# Patient Record
Sex: MALE | Race: BLACK OR AFRICAN AMERICAN | NOT HISPANIC OR LATINO
[De-identification: names, ages, dates, MRNs, and addresses within clinical notes are randomized per-mention and may not be internally consistent; named-entity substitution may affect disease eponyms.]

---

## 2020-09-29 PROBLEM — Z00.00 ENCOUNTER FOR PREVENTIVE HEALTH EXAMINATION: Status: ACTIVE | Noted: 2020-09-29

## 2020-09-30 ENCOUNTER — APPOINTMENT (OUTPATIENT)
Dept: OTOLARYNGOLOGY | Facility: CLINIC | Age: 55
End: 2020-09-30
Payer: COMMERCIAL

## 2020-09-30 VITALS
OXYGEN SATURATION: 98 % | HEIGHT: 67 IN | BODY MASS INDEX: 37.04 KG/M2 | SYSTOLIC BLOOD PRESSURE: 162 MMHG | HEART RATE: 76 BPM | WEIGHT: 236 LBS | DIASTOLIC BLOOD PRESSURE: 101 MMHG | TEMPERATURE: 97.6 F

## 2020-09-30 DIAGNOSIS — Z78.9 OTHER SPECIFIED HEALTH STATUS: ICD-10-CM

## 2020-09-30 DIAGNOSIS — Z86.79 PERSONAL HISTORY OF OTHER DISEASES OF THE CIRCULATORY SYSTEM: ICD-10-CM

## 2020-09-30 DIAGNOSIS — Z83.3 FAMILY HISTORY OF DIABETES MELLITUS: ICD-10-CM

## 2020-09-30 DIAGNOSIS — M54.12 RADICULOPATHY, CERVICAL REGION: ICD-10-CM

## 2020-09-30 DIAGNOSIS — M50.90 CERVICAL DISC DISORDER, UNSPECIFIED, UNSPECIFIED CERVICAL REGION: ICD-10-CM

## 2020-09-30 PROCEDURE — 99203 OFFICE O/P NEW LOW 30 MIN: CPT | Mod: 25

## 2020-09-30 PROCEDURE — 31575 DIAGNOSTIC LARYNGOSCOPY: CPT

## 2020-10-01 PROBLEM — Z78.9 NEVER USED TOBACCO: Status: ACTIVE | Noted: 2020-09-30

## 2020-10-01 PROBLEM — Z78.9 CAFFEINE USE: Status: ACTIVE | Noted: 2020-10-01

## 2020-10-01 PROBLEM — M54.12 CERVICAL RADICULOPATHY: Status: ACTIVE | Noted: 2020-10-01

## 2020-10-01 PROBLEM — Z86.79 HISTORY OF HYPERTENSION: Status: RESOLVED | Noted: 2020-09-30 | Resolved: 2020-10-01

## 2020-10-01 PROBLEM — Z83.3 FAMILY HISTORY OF DIABETES MELLITUS: Status: ACTIVE | Noted: 2020-09-30

## 2020-10-01 PROBLEM — M50.90 CERVICAL DISC DISEASE: Status: ACTIVE | Noted: 2020-10-01

## 2020-10-01 RX ORDER — LOSARTAN POTASSIUM 100 MG/1
100 TABLET, FILM COATED ORAL
Refills: 0 | Status: ACTIVE | COMMUNITY

## 2020-10-01 RX ORDER — HYDROCHLOROTHIAZIDE 12.5 MG/1
12.5 TABLET ORAL
Refills: 0 | Status: ACTIVE | COMMUNITY

## 2020-10-01 NOTE — ASSESSMENT
[FreeTextEntry1] : 55M planned for ACDF with Dr. May on 10/27/2020, will assist on approach. Vocal cords mobile.  Discussed risks of cranial neuropathy, damage to the great vessels, pharynx, esophagus, and larynx at length.\par

## 2020-10-01 NOTE — PROCEDURE
[Image(s) Captured] : image(s) captured and filed [Unable to Cooperate with Mirror] : patient unable to cooperate with mirror [None] : none [Flexible Endoscope] : examined with the flexible endoscope [de-identified] : Flexible fiberoptic laryngoscope advanced orally, no oropharyngeal lesions or masses identified, larynx visualized, adequate and symmetric cord adduction and abduction noted.\par  [de-identified] : pre-operative evaluation of vocal cord function
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
